# Patient Record
Sex: FEMALE | Race: OTHER | Employment: FULL TIME | URBAN - METROPOLITAN AREA
[De-identification: names, ages, dates, MRNs, and addresses within clinical notes are randomized per-mention and may not be internally consistent; named-entity substitution may affect disease eponyms.]

---

## 2019-02-22 ENCOUNTER — LAB ENCOUNTER (OUTPATIENT)
Dept: LAB | Facility: HOSPITAL | Age: 39
End: 2019-02-22
Attending: OBSTETRICS & GYNECOLOGY
Payer: COMMERCIAL

## 2019-02-22 ENCOUNTER — OFFICE VISIT (OUTPATIENT)
Dept: OBGYN CLINIC | Facility: CLINIC | Age: 39
End: 2019-02-22
Payer: COMMERCIAL

## 2019-02-22 VITALS
DIASTOLIC BLOOD PRESSURE: 96 MMHG | HEART RATE: 102 BPM | BODY MASS INDEX: 36.6 KG/M2 | WEIGHT: 184 LBS | SYSTOLIC BLOOD PRESSURE: 141 MMHG | HEIGHT: 59.5 IN

## 2019-02-22 DIAGNOSIS — Z12.4 SCREENING FOR MALIGNANT NEOPLASM OF CERVIX: ICD-10-CM

## 2019-02-22 DIAGNOSIS — Z12.31 SCREENING MAMMOGRAM, ENCOUNTER FOR: ICD-10-CM

## 2019-02-22 DIAGNOSIS — Z11.3 SCREEN FOR STD (SEXUALLY TRANSMITTED DISEASE): ICD-10-CM

## 2019-02-22 DIAGNOSIS — Z80.3 FAMILY HISTORY OF BREAST CANCER: ICD-10-CM

## 2019-02-22 DIAGNOSIS — Z01.419 ENCOUNTER FOR GYNECOLOGICAL EXAMINATION WITHOUT ABNORMAL FINDING: Primary | ICD-10-CM

## 2019-02-22 PROCEDURE — 99385 PREV VISIT NEW AGE 18-39: CPT | Performed by: OBSTETRICS & GYNECOLOGY

## 2019-02-22 PROCEDURE — 36415 COLL VENOUS BLD VENIPUNCTURE: CPT

## 2019-02-22 PROCEDURE — 86780 TREPONEMA PALLIDUM: CPT

## 2019-02-22 PROCEDURE — 80074 ACUTE HEPATITIS PANEL: CPT

## 2019-02-22 PROCEDURE — 87389 HIV-1 AG W/HIV-1&-2 AB AG IA: CPT

## 2019-02-22 NOTE — H&P
HPI:  The patient is a 44 yo F here for [employer]. Sexually active. Was in same sex relationship, but it ended due to s/o infidelity. Wants full STD screen. Monthly regular cycles. No Bc. Family history of breast CA (mom dx with breast ca in 45s).       L organizations: Not on file        Relationship status: Not on file      Intimate partner violence:        Fear of current or ex partner: Not on file        Emotionally abused: Not on file        Physically abused: Not on file        Forced sexual activity: adenopathy is noted  Breast: normal without palpable masses, tenderness, asymmetry, nipple discharge, nipple retraction or skin changes  Abdomen:  soft, nontender, nondistended, no masses  Skin/Hair: no unusual rashes or bruises  Extremities: no edema, no

## 2019-02-23 LAB
HAV IGM SER QL: NONREACTIVE
HBV CORE IGM SER QL: NONREACTIVE
HBV SURFACE AG SERPL QL IA: NONREACTIVE
HCV AB SERPL QL IA: NONREACTIVE

## 2019-02-24 LAB
C TRACH DNA SPEC QL NAA+PROBE: NEGATIVE
HPV I/H RISK 1 DNA SPEC QL NAA+PROBE: NEGATIVE
N GONORRHOEA DNA SPEC QL NAA+PROBE: NEGATIVE

## 2019-02-25 LAB
T PALLIDUM AB SER QL: NEGATIVE
T VAGINALIS RRNA SPEC QL NAA+PROBE: NEGATIVE

## 2019-02-26 ENCOUNTER — OFFICE VISIT (OUTPATIENT)
Dept: INTERNAL MEDICINE CLINIC | Facility: CLINIC | Age: 39
End: 2019-02-26
Payer: COMMERCIAL

## 2019-02-26 VITALS
DIASTOLIC BLOOD PRESSURE: 87 MMHG | HEART RATE: 103 BPM | HEIGHT: 59 IN | SYSTOLIC BLOOD PRESSURE: 133 MMHG | WEIGHT: 187 LBS | RESPIRATION RATE: 18 BRPM | BODY MASS INDEX: 37.7 KG/M2

## 2019-02-26 DIAGNOSIS — E66.09 CLASS 2 OBESITY DUE TO EXCESS CALORIES WITHOUT SERIOUS COMORBIDITY WITH BODY MASS INDEX (BMI) OF 37.0 TO 37.9 IN ADULT: ICD-10-CM

## 2019-02-26 DIAGNOSIS — R06.00 DOE (DYSPNEA ON EXERTION): ICD-10-CM

## 2019-02-26 DIAGNOSIS — I10 HYPERTENSION, UNSPECIFIED TYPE: Primary | ICD-10-CM

## 2019-02-26 DIAGNOSIS — Z80.3 FAMILY HISTORY OF BREAST CANCER IN FIRST DEGREE RELATIVE: ICD-10-CM

## 2019-02-26 PROCEDURE — 99212 OFFICE O/P EST SF 10 MIN: CPT | Performed by: INTERNAL MEDICINE

## 2019-02-26 PROCEDURE — 99203 OFFICE O/P NEW LOW 30 MIN: CPT | Performed by: INTERNAL MEDICINE

## 2019-02-26 NOTE — PATIENT INSTRUCTIONS
Low-Salt Choices  Eating salt (sodium) can make your body retain too much water. Excess water makes your heart work harder. Canned, packaged, and frozen foods are easy to prepare. But they are often high in sodium.  Here are some ideas for low-salt foods yo

## 2019-02-26 NOTE — PROGRESS NOTES
Swati Del Angel is a 43year old female.   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

## 2019-02-27 ENCOUNTER — LAB ENCOUNTER (OUTPATIENT)
Dept: LAB | Facility: HOSPITAL | Age: 39
End: 2019-02-27
Attending: OBSTETRICS & GYNECOLOGY
Payer: COMMERCIAL

## 2019-02-27 ENCOUNTER — HOSPITAL ENCOUNTER (OUTPATIENT)
Dept: MAMMOGRAPHY | Age: 39
Discharge: HOME | End: 2019-02-27
Attending: OBSTETRICS & GYNECOLOGY
Payer: COMMERCIAL

## 2019-02-27 DIAGNOSIS — Z80.3 FAMILY HISTORY OF BREAST CANCER: ICD-10-CM

## 2019-02-27 DIAGNOSIS — I10 HYPERTENSION, UNSPECIFIED TYPE: ICD-10-CM

## 2019-02-27 DIAGNOSIS — Z12.31 SCREENING MAMMOGRAM, ENCOUNTER FOR: ICD-10-CM

## 2019-02-27 LAB
ALBUMIN SERPL-MCNC: 3.6 G/DL (ref 3.4–5)
ALBUMIN/GLOB SERPL: 0.8 {RATIO} (ref 1–2)
ALP LIVER SERPL-CCNC: 67 U/L (ref 37–98)
ALT SERPL-CCNC: 42 U/L (ref 13–56)
ANION GAP SERPL CALC-SCNC: 7 MMOL/L (ref 0–18)
AST SERPL-CCNC: 25 U/L (ref 15–37)
BASOPHILS # BLD AUTO: 0.04 X10(3) UL (ref 0–0.2)
BASOPHILS NFR BLD AUTO: 0.4 %
BILIRUB SERPL-MCNC: 0.3 MG/DL (ref 0.1–2)
BUN BLD-MCNC: 14 MG/DL (ref 7–18)
BUN/CREAT SERPL: 18.7 (ref 10–20)
CALCIUM BLD-MCNC: 8.7 MG/DL (ref 8.5–10.1)
CHLORIDE SERPL-SCNC: 110 MMOL/L (ref 98–107)
CHOLEST SMN-MCNC: 222 MG/DL (ref ?–200)
CO2 SERPL-SCNC: 24 MMOL/L (ref 21–32)
CREAT BLD-MCNC: 0.75 MG/DL (ref 0.55–1.02)
DEPRECATED RDW RBC AUTO: 45.4 FL (ref 35.1–46.3)
EOSINOPHIL # BLD AUTO: 0.28 X10(3) UL (ref 0–0.7)
EOSINOPHIL NFR BLD AUTO: 3.1 %
ERYTHROCYTE [DISTWIDTH] IN BLOOD BY AUTOMATED COUNT: 13.6 % (ref 11–15)
GLOBULIN PLAS-MCNC: 4.4 G/DL (ref 2.8–4.4)
GLUCOSE BLD-MCNC: 95 MG/DL (ref 70–99)
HCT VFR BLD AUTO: 38.2 % (ref 35–48)
HDLC SERPL-MCNC: 50 MG/DL (ref 40–59)
HGB BLD-MCNC: 12 G/DL (ref 12–16)
IMM GRANULOCYTES # BLD AUTO: 0.03 X10(3) UL (ref 0–1)
IMM GRANULOCYTES NFR BLD: 0.3 %
LDLC SERPL CALC-MCNC: 138 MG/DL (ref ?–100)
LYMPHOCYTES # BLD AUTO: 2.43 X10(3) UL (ref 1–4)
LYMPHOCYTES NFR BLD AUTO: 26.8 %
M PROTEIN MFR SERPL ELPH: 8 G/DL (ref 6.4–8.2)
MCH RBC QN AUTO: 28.6 PG (ref 26–34)
MCHC RBC AUTO-ENTMCNC: 31.4 G/DL (ref 31–37)
MCV RBC AUTO: 91.2 FL (ref 80–100)
MONOCYTES # BLD AUTO: 0.48 X10(3) UL (ref 0.1–1)
MONOCYTES NFR BLD AUTO: 5.3 %
NEUTROPHILS # BLD AUTO: 5.82 X10 (3) UL (ref 1.5–7.7)
NEUTROPHILS # BLD AUTO: 5.82 X10(3) UL (ref 1.5–7.7)
NEUTROPHILS NFR BLD AUTO: 64.1 %
NONHDLC SERPL-MCNC: 172 MG/DL (ref ?–130)
OSMOLALITY SERPL CALC.SUM OF ELEC: 292 MOSM/KG (ref 275–295)
PLATELET # BLD AUTO: 333 10(3)UL (ref 150–450)
POTASSIUM SERPL-SCNC: 4.4 MMOL/L (ref 3.5–5.1)
RBC # BLD AUTO: 4.19 X10(6)UL (ref 3.8–5.3)
SODIUM SERPL-SCNC: 141 MMOL/L (ref 136–145)
TRIGL SERPL-MCNC: 171 MG/DL (ref 30–149)
TSI SER-ACNC: 1.61 MIU/ML (ref 0.36–3.74)
VLDLC SERPL CALC-MCNC: 34 MG/DL (ref 0–30)
WBC # BLD AUTO: 9.1 X10(3) UL (ref 4–11)

## 2019-02-27 PROCEDURE — 77063 BREAST TOMOSYNTHESIS BI: CPT | Performed by: OBSTETRICS & GYNECOLOGY

## 2019-02-27 PROCEDURE — 84443 ASSAY THYROID STIM HORMONE: CPT

## 2019-02-27 PROCEDURE — 77067 SCR MAMMO BI INCL CAD: CPT | Performed by: OBSTETRICS & GYNECOLOGY

## 2019-02-27 PROCEDURE — 85025 COMPLETE CBC W/AUTO DIFF WBC: CPT

## 2019-02-27 PROCEDURE — 80061 LIPID PANEL: CPT

## 2019-02-27 PROCEDURE — 36415 COLL VENOUS BLD VENIPUNCTURE: CPT

## 2019-02-27 PROCEDURE — 80053 COMPREHEN METABOLIC PANEL: CPT

## 2019-03-04 ENCOUNTER — TELEPHONE (OUTPATIENT)
Dept: OBGYN CLINIC | Facility: CLINIC | Age: 39
End: 2019-03-04

## 2019-03-04 NOTE — TELEPHONE ENCOUNTER
----- Message from Joe Bautista DO sent at 3/1/2019 11:43 PM CST -----  Pls notify of normal pap and std screen. ?BV on pap. Symptoms?  If so, okay for flagyl or metrogel

## 2019-03-04 NOTE — TELEPHONE ENCOUNTER
Informed pt that [address] stated normal pap and std screen. Pt denies symptoms of BV, so no flagyl or metrogel will be sent.

## 2019-03-27 ENCOUNTER — OFFICE VISIT (OUTPATIENT)
Dept: INTERNAL MEDICINE CLINIC | Facility: CLINIC | Age: 39
End: 2019-03-27
Payer: COMMERCIAL

## 2019-03-27 VITALS
SYSTOLIC BLOOD PRESSURE: 130 MMHG | WEIGHT: 180 LBS | HEART RATE: 97 BPM | BODY MASS INDEX: 35.34 KG/M2 | DIASTOLIC BLOOD PRESSURE: 87 MMHG | TEMPERATURE: 98 F | HEIGHT: 60 IN

## 2019-03-27 DIAGNOSIS — G47.33 OSA (OBSTRUCTIVE SLEEP APNEA): ICD-10-CM

## 2019-03-27 DIAGNOSIS — Z80.3 FAMILY HISTORY OF BREAST CANCER IN FIRST DEGREE RELATIVE: ICD-10-CM

## 2019-03-27 DIAGNOSIS — E66.09 CLASS 2 OBESITY DUE TO EXCESS CALORIES WITHOUT SERIOUS COMORBIDITY WITH BODY MASS INDEX (BMI) OF 37.0 TO 37.9 IN ADULT: ICD-10-CM

## 2019-03-27 DIAGNOSIS — Z00.00 PHYSICAL EXAM: Primary | ICD-10-CM

## 2019-03-27 PROCEDURE — 99395 PREV VISIT EST AGE 18-39: CPT | Performed by: INTERNAL MEDICINE
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
## 2021-03-07 ENCOUNTER — HOSPITAL ENCOUNTER (OUTPATIENT)
Age: 41
Discharge: HOME | End: 2021-03-07
Payer: COMMERCIAL

## 2021-03-07 VITALS
DIASTOLIC BLOOD PRESSURE: 88 MMHG | BODY MASS INDEX: 33.04 KG/M2 | RESPIRATION RATE: 16 BRPM | TEMPERATURE: 98 F | HEART RATE: 112 BPM | WEIGHT: 175 LBS | HEIGHT: 61 IN | SYSTOLIC BLOOD PRESSURE: 141 MMHG | OXYGEN SATURATION: 99 %

## 2021-03-07 DIAGNOSIS — B34.9 VIRAL ILLNESS: ICD-10-CM

## 2021-03-07 DIAGNOSIS — R05.9 COUGH: Primary | ICD-10-CM

## 2021-03-07 DIAGNOSIS — Z20.822 ENCOUNTER FOR SCREENING LABORATORY TESTING FOR COVID-19 VIRUS: ICD-10-CM

## 2021-03-07 DIAGNOSIS — R00.0 TACHYCARDIA: ICD-10-CM

## 2021-03-07 DIAGNOSIS — R03.0 ELEVATED BLOOD PRESSURE READING: ICD-10-CM

## 2021-03-07 LAB — SARS-COV-2 RNA RESP QL NAA+PROBE: NOT DETECTED

## 2021-03-07 PROCEDURE — 93000 ELECTROCARDIOGRAM COMPLETE: CPT | Performed by: NURSE PRACTITIONER

## 2021-03-07 PROCEDURE — U0002 COVID-19 LAB TEST NON-CDC: HCPCS | Performed by: NURSE PRACTITIONER

## 2021-03-07 PROCEDURE — 99202 OFFICE O/P NEW SF 15 MIN: CPT | Performed by: NURSE PRACTITIONER

## 2021-03-07 NOTE — ED INITIAL ASSESSMENT (HPI)
Pt here today for COVID testing. States work is needed her tested prior to coming back, as she was sick earlier this week with cough, diarrhea, loss of taste and smell.

## 2022-03-02 ENCOUNTER — HOSPITAL ENCOUNTER (EMERGENCY)
Facility: HOSPITAL | Age: 42
Discharge: HOME | End: 2022-03-02
Attending: EMERGENCY MEDICINE
Payer: COMMERCIAL

## 2022-03-02 ENCOUNTER — APPOINTMENT (OUTPATIENT)
Dept: ULTRASOUND IMAGING | Facility: HOSPITAL | Age: 42
End: 2022-03-02
Attending: EMERGENCY MEDICINE
Payer: COMMERCIAL

## 2022-03-02 VITALS
OXYGEN SATURATION: 98 % | RESPIRATION RATE: 16 BRPM | WEIGHT: 176 LBS | HEIGHT: 61 IN | BODY MASS INDEX: 33.23 KG/M2 | SYSTOLIC BLOOD PRESSURE: 136 MMHG | HEART RATE: 78 BPM | TEMPERATURE: 98 F | DIASTOLIC BLOOD PRESSURE: 82 MMHG

## 2022-03-02 DIAGNOSIS — S80.12XA HEMATOMA OF LEFT LOWER EXTREMITY, INITIAL ENCOUNTER: ICD-10-CM

## 2022-03-02 DIAGNOSIS — S86.912A STRAIN OF UNSPECIFIED MUSCLE(S) AND TENDON(S) AT LOWER LEG LEVEL, LEFT LEG, INITIAL ENCOUNTER: Primary | ICD-10-CM

## 2022-03-02 PROCEDURE — 93971 EXTREMITY STUDY: CPT | Performed by: EMERGENCY MEDICINE

## 2022-03-02 PROCEDURE — 99284 EMERGENCY DEPT VISIT MOD MDM: CPT

## 2022-03-02 RX ORDER — IBUPROFEN 400 MG/1
400 TABLET ORAL ONCE
Status: COMPLETED | OUTPATIENT
Start: 2022-03-02 | End: 2022-03-02

## 2022-03-02 NOTE — ED QUICK NOTES
Pt dcd to home aox3, ambulatory with crutches, discharge instruction given and voices understanding, denies any concern

## 2022-03-02 NOTE — ED INITIAL ASSESSMENT (HPI)
Pt c/o on Monday she stepped \"weird\" and felt a \"pop\" in the left calve. Pt states she notices some swelling the the area.

## 2023-04-14 NOTE — PATIENT INSTRUCTIONS
Your blood pressure is elevated. Monitor your blood pressure at home and record your results. Monitor your diet and salt intake.
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
## 2024-05-14 NOTE — TELEPHONE ENCOUNTER
Signed/completed [facility] enrollment form, pt chart notes, pt insurance in triage room \"Biologic\" bin.   Pending labs. Once labs received can fax over to [facility].

## 2024-07-10 NOTE — TELEPHONE ENCOUNTER
From: Brenda Portillo  To: Dayana Rodriguez  Sent: 7/9/2024  5:34 PM CDT  Subject: Mental health    I just lost my brother I’m not there mentally I need a little time from work like maybe a week I just can’t I’m not good       Unable to leave message for patient to call back and discuss. (Mailbox full)  [facility] message sent as well on alternative message.

## 2024-07-10 NOTE — TELEPHONE ENCOUNTER
From: Brenda Portillo  To: Dayana Rodriguez  Sent: 7/9/2024 5:34 PM CDT  Subject: Mental health     I just lost my brother I’m not there mentally I need a little time from work like maybe a week I just can’t I’m not good

## 2024-07-11 NOTE — TELEPHONE ENCOUNTER
Attempted to call patient-unable to leave a message as mailbox is full  Patient has not read [facility] message  Read Composed From To  Subject   N 7/10/2024  7:38 AM [facility]     Patient last seen by JENNIFER Zapien 04/28/2023

## 2024-07-16 NOTE — TELEPHONE ENCOUNTER
Patient's mother called    Very aggressive and upset that patient was scheduled with DM again. States Dr. Giraldo is \" crazy\". The  \" doesn't know what she is doing\". Vulgar language used. Patient's mother advised to call the pride line. Call disconnected

## 2024-10-23 NOTE — ED INITIAL ASSESSMENT (HPI)
Patient presents to ED via EMS with c/o chest pain that began approx 1.5 hours ago while at work. Patient states that she was vomiting and felt the chest pain start. Patient was working and felt a mid to left sternal chest pain. Patient was given 324 mg of Aspirin by EMS, and 1 nitroglycerin tab, and felt immediate increase in pain. Patient dry heaved a couple times and while sitting up felt relief. Pain went from a 7-2/10 after sitting up. Patient is also trying to stop drinking, last drink was Tuesday at 1000.
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
## 2024-10-23 NOTE — ED QUICK NOTES
Manjeet provided resources. IV removed with catheter intact. Ambulatory out of ED in no distress. Verbalized understanding of discharge instructions.

## 2024-10-23 NOTE — ED QUICK NOTES
Rounding Completed    Plan of Care reviewed. Waiting for lab results to finalize. Patient has felt some improvement from initial medication administration.  Improvement in BP and HR noted.  Elimination needs assessed, patient aware we do need a urine sample.  Respirations regular and unlabored.      Bed is locked and in lowest position. Call light within reach.

## 2024-10-23 NOTE — DISCHARGE INSTRUCTIONS
Thank you for seeking care at [facility] Emergency Department.    You have been seen and evaluated for alcohol use as well as chest pain and vomiting. Your labs, EKG and chest x-ray today did not show severe findings.      We discussed the results of your workup   Please read the instructions provided   If given prescriptions, take as instructed   Do not use illicit drugs or non-prescribed medications    We monitored you in the emergency department while you were intoxicated and reevaluated you upon sobriety. All of your testing that was done was normal and your vital signs were stable when you were discharged home; you had not started to show signs of alcohol withdrawal. If you drink every single day you may start to feel alcohol withdrawal.    Chronic alcohol abuse can cause severe health consequences including but not limited to damage to your liver, your pancreas, your heart, and your immune system. Additionally, chronic alcohol abuse can affect the way that you eat and can cause several nutritional deficiencies with health consequences of their own. Please eat a balanced diet and take a multivitamin daily. You should progressively cut back on your alcohol intake and then stop drinking alcohol completely, but watch for signs of alcohol withdrawal.     Alcohol withdrawal can be dangerous. Signs of alcohol withdrawal are shaking and tremors, sweating, fast heart rate, high blood pressure, confusion, hallucinations.  Your chances of going into alcohol withdrawal are increased if you suddenly go from drinking a lot of alcohol to no alcohol. This is why it is recommended that you progressively decrease your drinking, or go to a detox center to withdraw from alcohol safely.  If you begin having signs of alcohol withdrawal you need to seek medical attention as this may lead to fatal seizures if the withdrawal is very severe.    Some individuals find Alcoholics Anonymous helpful when attempting to stop drinking. If  you would like to try this please call the [facility] ([phone]) to find out about local AA meetings.    Remember, your care process does not end after your visit today. Please follow-up with your primary care provider  in 1-2 days for further evaluation of your alcohol use disorder and to see if you need any further evaluation/testing, or to evaluate for any alternate diagnoses.    Your blood pressure was noted to be elevated in the ER today. Please follow up with your primary doctor within the next 2-3 months for a reevaluation. Uncontrolled high blood pressure can lead to strokes, heart attacks, and kidney failure.     Please return to the emergency department immediately if you develop any symptoms mentioned above, if you have abdominal pain, chest pain, inability to eat or drink due to vomiting, vomiting blood/clots, fever or if you develop any other new or concerning symptoms as these could be signs of more serious medical illness.
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
## 2024-10-23 NOTE — ED QUICK NOTES
Assumed care of patient , resting on cart, states she still has some chest pains. 2 hour drawn and sent. Will continue to monitor.

## 2024-10-24 NOTE — TELEPHONE ENCOUNTER
Patient stated that she was in the emergency room yesterday. Chest pain and shortness of breath are gone now, but still vomiting. Drinking fluids. States that can not hold anything down but still sipping fluids. Feels hungry today. Has not picked up the zofran yet. Advised patient to pickup the zofran to help with the nausea/vomiting and slowly drink fluids and increase slowly as tolerated. If still not able to keep fluids down then should go back to the emergency room. Patient verbalized understanding. Tried to offer appointment this morning with any available provider but patient declined. Stated that she is just going to go to sleep now. States she works nights. Appointment made for tomorrow at 10am with Tamika Lovell at [facility]. Advised patient to continue with fluids with electrolytes to stay hydrated, BRAT(bland)(bread, rice, apple, toast, banana) diet as tolerated.  Advised patient no alcohol or acetaminophen/tylenol since liver enzymes elevated. Patient verbalized understanding.     Disposition and Plan      Clinical Impression:  1. Acute chest pain    2. Alcohol use disorder    3. Alcohol withdrawal syndrome without complication (HCC)    4. Acute alcoholic gastritis with hemorrhage    5. Transaminitis    6. Elevated blood pressure reading          Disposition:  Discharge  10/23/2024  4:34 am     Follow-up:  Nonstaff, Physician     Schedule an appointment as soon as possible for a visit in 2 day(s)        [facility] Emergency Department  [address]  [facility] [id number] [phone]  Go to  If symptoms worsen, immediately     Leobardo River MD  [address]  [facility] [id number] [phone]     Schedule an appointment as soon as possible for a visit in 2 day(s)        Marquis Hill MD  [address]  [facility] [address] [phone]     Schedule an appointment as soon as possible for a visit in 1 week(s)                 Medications Prescribed:  Discharge Medication  List as of 10/23/2024  4:37 AM              START taking these medications     Details   pantoprazole 40 MG Oral Tab EC Take 1 tablet (40 mg total) by mouth daily., Normal, Disp-30 tablet, R-0       ondansetron 4 MG Oral Tablet Dispersible Take 1 tablet (4 mg total) by mouth every 4 (four) hours as needed for Nausea., Normal, Disp-10 tablet, R-0       chlordiazePOXIDE 25 MG Oral Cap Take 1 capsule (25 mg total) by mouth 3 (three) times daily as needed for Anxiety (Alcohol withdrawal symptoms). Take 2 capsules every 6 hours as needed (for alcohol withdrawal symptoms) for 1 day, THEN 1 capsule every 6 hours as needed (for alcohol with drawal symptoms) for 1 day, THEN 1 capsule every 12 hours as needed (for alcohol withdrawal symptoms) for 2 days, THEN 1 capsule at bedtime as needed (for alcohol withdrawal symptoms), Print, Disp-21 capsule, R-0

## 2024-10-25 NOTE — PROGRESS NOTES
Subjective:   Brenda Portillo is a 44 year old female who presents for ER F/U (Chest Pain) and Abdominal Pain     Was seen in ED on 10/23 with hematemesis and alcohol withdrawal and was discharged home on Librium     Still nauseous and vomiting - last emesis episode was about 3 hours ago which was bilious and sometimes flecks of blood   Had not had hematemesis since the ER visit   Having diarrhea and soft stools, has not looked at the color so unclear if there was blood or black stools   Urine is an orange color for the past several days    Last drink of alcohol was at 4am - had started feeling tremors and severe nausea so took a drink to avoid withdrawal  Pharmacy did not have the librium when she went to pick it up, they had to order it, but they do have it in stock now and she is planning to go pick it up after this appointment    Chest pressure and trouble breathing have resolved  Felt like she had a ball in her throat prior but does not feel it anymore   Tried to take a few bites of food but vomited after   Has been taking propel and gatorade which has stayed down   Plain water makes her sick  Picked up the nausea medication but has not taken it yet   Feeling mildly anxious   Tremors in hands are starting again  Sweats when she sleeps   Has had episodes of fast heart rate   Denies confusion or hallucinations    Has never gone through alcohol withdrawal    Was in [place] last weekend and was drinking a lot and felt warm and chills on Saturday     RN triage note from 10/24-  Patient stated that she was in the emergency room yesterday. Chest pain and shortness of breath are gone now, but still vomiting. Drinking fluids. States that can not hold anything down but still sipping fluids. Feels hungry today. Has not picked up the zofran yet. Advised patient to pickup the zofran to help with the nausea/vomiting and slowly drink fluids and increase slowly as tolerated. If still not able to keep fluids down then should  go back to the emergency room. Patient verbalized understanding. Tried to offer appointment this morning with any available provider but patient declined. Stated that she is just going to go to sleep now. States she works nights.     History/Other:    Chief Complaint Reviewed and Verified  Nursing Notes Reviewed and   Verified  Tobacco Reviewed  Allergies Reviewed  Medications Reviewed         Tobacco:  She smoked tobacco in the past but quit greater than 12 months ago.  Social History     Tobacco Use   Smoking Status Former    Types: Cigarettes   Smokeless Tobacco Never   Tobacco Comments    social        Current Outpatient Medications   Medication Sig Dispense Refill    pantoprazole 40 MG Oral Tab EC Take 1 tablet (40 mg total) by mouth daily. 30 tablet 0    ondansetron 4 MG Oral Tablet Dispersible Take 1 tablet (4 mg total) by mouth every 4 (four) hours as needed for Nausea. 10 tablet 0    chlordiazePOXIDE 25 MG Oral Cap Take 1 capsule (25 mg total) by mouth 3 (three) times daily as needed for Anxiety (Alcohol withdrawal symptoms). Take 2 capsules every 6 hours as needed (for alcohol withdrawal symptoms) for 1 day, THEN 1 capsule every 6 hours as needed (for alcohol withdrawal symptoms) for 1 day, THEN 1 capsule every 12 hours as needed (for alcohol withdrawal symptoms) for 2 days, THEN 1 capsule at bedtime as needed (for alcohol withdrawal symptoms) 21 capsule 0    clotrimazole-betamethasone 1-0.05 % External Cream Apply 1 Application topically 2 (two) times daily as needed. 60 g 0     Review of Systems:  Review of Systems  10 point review of systems otherwise negative with the exception of HPI and assessment and plan    Objective:   BP (!) 148/92   Pulse 95   Ht 5' 1\" (1.549 m)   Wt 173 lb (78.5 kg)   LMP 09/01/2024 (Approximate)   SpO2 97%   BMI 32.69 kg/m²  Estimated body mass index is 32.69 kg/m² as calculated from the following:    Height as of this encounter: 5' 1\" (1.549 m).    Weight as of  this encounter: 173 lb (78.5 kg).  Physical Exam  Vitals reviewed.   Constitutional:       Appearance: She is well-developed. She is obese. She is ill-appearing.   Cardiovascular:      Rate and Rhythm: Normal rate and regular rhythm.      Heart sounds: Normal heart sounds.   Pulmonary:      Effort: Pulmonary effort is normal.      Breath sounds: Normal breath sounds.   Abdominal:      General: Bowel sounds are normal.      Palpations: Abdomen is soft.   Skin:     General: Skin is warm and dry.   Neurological:      Mental Status: She is alert and oriented to person, place, and time.       Assessment & Plan:   1. Hematemesis with nausea (Primary)  -     Gastro Referral - In Network  2. Alcohol use disorder  -     Gastro Referral - In Network  Has not called GI yet - provided referral again    librium and start using this as well as zofran   Monitor withdrawal symptoms and if experiences confusion, bloody emesis, or other severe symptom should return to the ER   Not cleared to return to work - will need to be re evaluated next week    SHIRLEY Nava, 10/25/2024, 9:47 AM

## 2024-10-30 NOTE — PROGRESS NOTES
Subjective:   Brenda Portillo is a 44 year old female who presents for Follow - Up     Last drink was on Saturday because there was a sports event on  Never took the librium, withdrew from alcohol without medical assistance   Almost went back to the ER for bad tremors and nausea over the weekend but feeling much better now  Stomach is still upset, has nausea within 10 minutes of eating anything  When she goes to have a bowel movement feels like vomiting at the same time   She is eating a little more now, has not taken the nausea medication  Vomited on Monday due to over-eating, mostly undigested food and had a very small tinge of blood   Tries to eat something every 2 hours   Hydrating with water and Gatorade, tea   Has been fluctuating between constipated and loose stools   No blood in the stool or dark tarry stools   Has been eating rice, beans, had [employer]otle yesterday, crackers   Tries some chicken tinga and had steak at [employer]     Feels ready to go back to work tomorrow   Sleeping better over the past 2 days     Has psoriasis   Is on a cancellation waiting list for a dermatologist out [facility]    Had a bad experience with one of the [facility] dermatologists    History/Other:    Chief Complaint Reviewed and Verified  No Further Nursing Notes to   Review  Tobacco Reviewed  Allergies Reviewed  Medications Reviewed    Problem List Reviewed  Medical History Reviewed  Surgical History   Reviewed  OB Status Reviewed  Family History Reviewed         Tobacco:  She smoked tobacco in the past but quit greater than 12 months ago.  Social History     Tobacco Use   Smoking Status Former    Types: Cigarettes   Smokeless Tobacco Never   Tobacco Comments    social        Current Outpatient Medications   Medication Sig Dispense Refill    pantoprazole 40 MG Oral Tab EC Take 1 tablet (40 mg total) by mouth daily. (Patient not taking: Reported on 10/30/2024) 30 tablet 0    ondansetron 4 MG Oral Tablet Dispersible Take 1  tablet (4 mg total) by mouth every 4 (four) hours as needed for Nausea. (Patient not taking: Reported on 10/30/2024) 10 tablet 0    chlordiazePOXIDE 25 MG Oral Cap Take 1 capsule (25 mg total) by mouth 3 (three) times daily as needed for Anxiety (Alcohol withdrawal symptoms). Take 2 capsules every 6 hours as needed (for alcohol withdrawal symptoms) for 1 day, THEN 1 capsule every 6 hours as needed (for alcohol withdrawal symptoms) for 1 day, THEN 1 capsule every 12 hours as needed (for alcohol withdrawal symptoms) for 2 days, THEN 1 capsule at bedtime as needed (for alcohol withdrawal symptoms) (Patient not taking: Reported on 10/30/2024) 21 capsule 0    clotrimazole-betamethasone 1-0.05 % External Cream Apply 1 Application topically 2 (two) times daily as needed. (Patient not taking: Reported on 10/30/2024) 60 g 0     Review of Systems:  Review of Systems  10 point review of systems otherwise negative with the exception of HPI and assessment and plan    Objective:   /88   Pulse 90   Ht 5' 1\" (1.549 m)   Wt 171 lb (77.6 kg)   LMP 09/01/2024 (Approximate)   SpO2 94%   BMI 32.31 kg/m²  Estimated body mass index is 32.31 kg/m² as calculated from the following:    Height as of this encounter: 5' 1\" (1.549 m).    Weight as of this encounter: 171 lb (77.6 kg).  Physical Exam  Vitals reviewed.   Constitutional:       General: She is not in acute distress.     Appearance: Normal appearance. She is well-developed.   Cardiovascular:      Rate and Rhythm: Normal rate and regular rhythm.      Heart sounds: Normal heart sounds.   Pulmonary:      Effort: Pulmonary effort is normal.      Breath sounds: Normal breath sounds.   Abdominal:      General: Bowel sounds are normal. There is no distension.      Palpations: Abdomen is soft. There is no mass.      Tenderness: There is no abdominal tenderness.   Skin:     General: Skin is warm and dry.   Neurological:      Mental Status: She is alert and oriented to person,  place, and time.       Assessment & Plan:   1. Alcohol use disorder (Primary)  Abstained from alcohol since 10/26  Symptoms are greatly improved, no withdrawal symptoms today   Take zofran 30 minutes before a meal   Add fiber supplement such as metamucil   Has GI appt on 11/21  Provided return to work note     SHIRLEY Nava, 10/30/2024, 10:04 AM

## 2024-11-13 NOTE — ED INITIAL ASSESSMENT (HPI)
Patient brought to ED by EMS from work. Patient is c/o of nausea, vomiting, and right sided abdominal pain. Patient states has upcoming appointment to see gastroenterologist for GI problems.
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
## 2024-11-13 NOTE — ED QUICK NOTES
Orders for admission, patient is aware of plan and ready to go upstairs. Any questions, please call ED RN Shyla at extension [phone].     Patient Covid vaccination status: Unvaccinated     COVID Test Ordered in ED: None    COVID Suspicion at Admission: N/A    Running Infusions:      Mental Status/LOC at time of transport: A&Ox4    Other pertinent information:   CIWA score: 8   NIH score:  N/A
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
## 2024-11-13 NOTE — ED QUICK NOTES
Pt unable to tolerate fluids orally.  Pt started vomiting again. HR increased.  MD aware, orders to follow.

## 2024-11-13 NOTE — PLAN OF CARE
Problem: Patient Centered Care  Goal: Patient preferences are identified and integrated in the patient's plan of care  Description: Interventions:  - What would you like us to know as we care for you? Home alone   - Provide timely, complete, and accurate information to patient/family  - Incorporate patient and family knowledge, values, beliefs, and cultural backgrounds into the planning and delivery of care  - Encourage patient/family to participate in care and decision-making at the level they choose  - Honor patient and family perspectives and choices  Outcome: Progressing     Problem: Patient/Family Goals  Goal: Patient/Family Long Term Goal  Description: Patient's Long Term Goal: Discharge    Interventions:  - Follow healthcare team treatment plan   - Monitor labs, vitals, and diagnostic test  - Pain Management   - Diet Recommendations  - Participate in therapy  - Discharge planning   - See additional Care Plan goals for specific interventions  Outcome: Progressing  Goal: Patient/Family Short Term Goal  Description: Patient's Short Term Goal: feel better    Interventions:   - - Follow healthcare team treatment plan   - Monitor labs, vitals, and diagnostic test  - Pain Management, pharmacological and non-pharmacological interventions  - Diet Recommendations  - Adequate oral intake  - GI consults  - Pysch consult   - antiemetics as needed   -IV hydration   -CIWA per protocol   - See additional Care Plan goals for specific interventions  Outcome: Progressing     Problem: PAIN - ADULT  Goal: Verbalizes/displays adequate comfort level or patient's stated pain goal  Description: INTERVENTIONS:  - Encourage pt to monitor pain and request assistance  - Assess pain using appropriate pain scale  - Administer analgesics based on type and severity of pain and evaluate response  - Implement non-pharmacological measures as appropriate and evaluate response  - Consider cultural and social influences on pain and pain  management  - Manage/alleviate anxiety  - Utilize distraction and/or relaxation techniques  - Monitor for opioid side effects  - Notify MD/LIP if interventions unsuccessful or patient reports new pain  - Anticipate increased pain with activity and pre-medicate as appropriate  Outcome: Progressing     Problem: DISCHARGE PLANNING  Goal: Discharge to home or other facility with appropriate resources  Description: INTERVENTIONS:  - Identify barriers to discharge w/pt and caregiver  - Include patient/family/discharge partner in discharge planning  - Arrange for needed discharge resources and transportation as appropriate  - Identify discharge learning needs (meds, wound care, etc)  - Arrange for interpreters to assist at discharge as needed  - Consider post-discharge preferences of patient/family/discharge partner  - Complete POLST form as appropriate  - Assess patient's ability to be responsible for managing their own health  - Refer to Case Management Department for coordinating discharge planning if the patient needs post-hospital services based on physician/LIP order or complex needs related to functional status, cognitive ability or social support system  Outcome: Progressing     Problem: GASTROINTESTINAL - ADULT  Goal: Minimal or absence of nausea and vomiting  Description: INTERVENTIONS:  - Maintain adequate hydration with IV or PO as ordered and tolerated  - Nasogastric tube to low intermittent suction as ordered  - Evaluate effectiveness of ordered antiemetic medications  - Provide nonpharmacologic comfort measures as appropriate  - Advance diet as tolerated, if ordered  - Obtain nutritional consult as needed  - Evaluate fluid balance  Outcome: Progressing     Problem: METABOLIC/FLUID AND ELECTROLYTES - ADULT  Goal: Electrolytes maintained within normal limits  Description: INTERVENTIONS:  - Monitor labs and rhythm and assess patient for signs and symptoms of electrolyte imbalances  - Administer electrolyte  replacement as ordered  - Monitor response to electrolyte replacements, including rhythm and repeat lab results as appropriate  - Fluid restriction as ordered  - Instruct patient on fluid and nutrition restrictions as appropriate  Outcome: Progressing
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
## 2024-11-14 NOTE — DISCHARGE INSTRUCTIONS
You were seen by Psychiatry while in the hospital. The recommendation is substance abuse treatment. Referrals for inpatient and outpatient services are listed below. Please CALL to schedule an intake appointment.    [facility]  [address] [place] [address]  [phone]    [facility]  Outpatient  [address] [address]  [phone]    [facility] [facility]   [facility]  [address]  [address]  [place] [address]  [phone]    [facility]  [address] [place] [address]  [phone]     [facility]  [facility]  [address] [place] [address]  [phone]    Referrals for Psychiatry and Therapy:    [facility] - multiple locations   Phone: [phone]    [facility]: [address] [place] [address]  Phone: [phone]    [facility]: [address] [place] [address]  Phone: [phone]    If you feel unsafe or your symptoms intensify, please call 9-1-1 or go to the nearest emergency room.    Check your BP daily at home in AM keep log for your appt with Tamika Lovell.

## 2024-11-14 NOTE — PLAN OF CARE
Problem: Patient Centered Care  Goal: Patient preferences are identified and integrated in the patient's plan of care  Description: Interventions:  - What would you like us to know as we care for you? Home alone   - Provide timely, complete, and accurate information to patient/family  - Incorporate patient and family knowledge, values, beliefs, and cultural backgrounds into the planning and delivery of care  - Encourage patient/family to participate in care and decision-making at the level they choose  - Honor patient and family perspectives and choices  Outcome: Progressing     Problem: Patient/Family Goals  Goal: Patient/Family Long Term Goal  Description: Patient's Long Term Goal: Discharge    Interventions:  - Follow healthcare team treatment plan   - Monitor labs, vitals, and diagnostic test  - Pain Management   - Diet Recommendations  - Participate in therapy  - Discharge planning   - See additional Care Plan goals for specific interventions  Outcome: Progressing  Goal: Patient/Family Short Term Goal  Description: Patient's Short Term Goal: feel better    Interventions:   - - Follow healthcare team treatment plan   - Monitor labs, vitals, and diagnostic test  - Pain Management, pharmacological and non-pharmacological interventions  - Diet Recommendations  - Adequate oral intake  - GI consults  - Pysch consult   - antiemetics as needed   -IV hydration   -CIWA per protocol   - See additional Care Plan goals for specific interventions  Outcome: Progressing     Problem: PAIN - ADULT  Goal: Verbalizes/displays adequate comfort level or patient's stated pain goal  Description: INTERVENTIONS:  - Encourage pt to monitor pain and request assistance  - Assess pain using appropriate pain scale  - Administer analgesics based on type and severity of pain and evaluate response  - Implement non-pharmacological measures as appropriate and evaluate response  - Consider cultural and social influences on pain and pain  management  - Manage/alleviate anxiety  - Utilize distraction and/or relaxation techniques  - Monitor for opioid side effects  - Notify MD/LIP if interventions unsuccessful or patient reports new pain  - Anticipate increased pain with activity and pre-medicate as appropriate  Outcome: Progressing     Problem: DISCHARGE PLANNING  Goal: Discharge to home or other facility with appropriate resources  Description: INTERVENTIONS:  - Identify barriers to discharge w/pt and caregiver  - Include patient/family/discharge partner in discharge planning  - Arrange for needed discharge resources and transportation as appropriate  - Identify discharge learning needs (meds, wound care, etc)  - Arrange for interpreters to assist at discharge as needed  - Consider post-discharge preferences of patient/family/discharge partner  - Complete POLST form as appropriate  - Assess patient's ability to be responsible for managing their own health  - Refer to Case Management Department for coordinating discharge planning if the patient needs post-hospital services based on physician/LIP order or complex needs related to functional status, cognitive ability or social support system  Outcome: Progressing

## 2024-11-14 NOTE — PROGRESS NOTES
[facility]  part of [facility]    Progress Note    Brenda Portillo Patient Status:  Observation    1980 MRN [id number]   Location [facility] 5SW/SE Attending Becca Lloyd MD   Hosp Day # 0 PCP PHYSICIAN NONSTAFF     Chief Complaint:   Chief Complaint   Patient presents with    Nausea/Vomiting/Diarrhea    Abdomen/Flank Pain       Subjective:   Brenda Portillo is doing better today. No further N/V. Still has RUQ pain worse with movement and taking a deep breath but feels better. No SOB no F/C.     CIWA 2-5 overnight. BP running high   Objective:   Objective:    Blood pressure (!) 148/94, pulse 86, temperature 97.9 °F (36.6 °C), temperature source Oral, resp. rate 16, height 5' 1\" (1.549 m), weight 173 lb 14.4 oz (78.9 kg), last menstrual period 10/29/2024, SpO2 93%.    Physical Exam:    General: No acute distress. Scalp psoriasis   Respiratory: Clear to auscultation bilaterally. No wheezes. No rhonchi.  Cardiovascular: S1, S2. Regular rate and rhythm. No murmurs, rubs or gallops.   Abdomen: Soft, nontender, nondistended.  Positive bowel sounds. No rebound or guarding.  Neurologic: No focal neurological deficits.   Musculoskeletal: Moves all extremities.  Extremities: No edema.      Results:   Results:    Labs:  Recent Labs   Lab 24  0313 24  0155   WBC 13.6* 7.0   HGB 12.9 11.3*   .5* 102.6*   .0 166.0       Recent Labs   Lab 24  0313 24  0155   * 97   BUN <5* <5*   CREATSERUM 0.68 0.51*   CA 9.7 8.8   ALB 4.5 3.8    138   K 3.5 3.3*    104   CO2 26.0 28.0   ALKPHO 149* 105*   * 73*   ALT 74* 41   BILT 1.0 1.6*   TP 8.6* 7.0       Estimated Creatinine Clearance: 105.1 mL/min (A) (based on SCr of 0.51 mg/dL (L)).    No results for input(s): \"PTP\", \"INR\" in the last 168 hours.         Culture:  No results found for this visit on 24.    Cardiac  No results for input(s): \"TROP\", \"PBNP\" in the last 168  hours.      Imaging: Imaging data reviewed in [facility].  XR CHEST AP PORTABLE  (CPT=71045)    Result Date: 11/13/2024  CONCLUSION: Low lung volumes.  Linear bibasilar pulmonary opacities likely representing atelectasis.   Dictated by (CST): Jemal Vazquez MD on 11/13/2024 at 10:27 AM     Finalized by (CST): Jemal Vazquez MD on 11/13/2024 at 10:28 AM          US GALLBLADDER (CPT=76705)    Result Date: 11/13/2024  CONCLUSION:   Normal gallbladder.  No biliary ductal dilatation.  Questionable pericholecystic fluid seen on the prior CT is not seen on this ultrasound.  Hepatic steatosis with focal fatty sparing near the gallbladder fossa.       Preliminary report was given by [facility].  There are no clinically significant discrepancies.    elm-remote   Dictated by (CST): Maco Talbert MD on 11/13/2024 at 9:43 AM     Finalized by (CST): Maco Talbert MD on 11/13/2024 at 10:00 AM          CT ABDOMEN+PELVIS(CONTRAST ONLY)(CPT=74177)    Result Date: 11/13/2024  CONCLUSION:   1. No radiopaque gallstones are seen, but there is mild pericholecystic fluid.  Differential etiology includes hepatocellular disease or less likely noncalcified gallstones.  Consider further evaluation with right upper quadrant ultrasound.  2. Hepatomegaly and steatosis.  Findings suggesting early fibrosis/portal hypertension including recanalization of the umbilical vein and small upper abdominal varices.  3. Mild thickening of the urinary bladder wall which may be secondary to underdistention, but cystitis is not entirely excluded.  Consider evaluation with urinalysis.  4. Additional chronic or incidental findings are described in the body of this report.      Preliminary report was given by [facility].  There are no clinically significant discrepancies.    elm-remote    Dictated by (CST): Maco Talbert MD on 11/13/2024 at 8:55 AM     Finalized by (CST): Maco Talbert MD on 11/13/2024 at 9:08 AM           Medications:    heparin  5,000 Units  Subcutaneous Q8H [facility]    thiamine  100 mg Oral Daily    multivitamin  1 tablet Oral Daily    folic acid  1 mg Oral Daily    pantoprazole  40 mg Intravenous Q12H    melatonin  5 mg Oral Nightly    ARIPiprazole  2 mg Oral Daily    clobetasol   Topical BID         Assessment and Plan:   Assessment & Plan:        N/V   RUQ abd pain   Secondary to alcoholic gastritis/hepatitis   GI on consult.   CT and US reviewed.   IV PPI BID   Advance diet to soft today   LFT's trending down   Hepatic steatosis   Secondary to ETOH  GI on consult.   US no focal liver lesions  Alcohol abuse   Insomnia   Psych on consult  Abilify, melatonin. Atarax PRN   CIWA protocol. Consider stopped as scoring low.   MV, Thiamine, FA.   Elevated BP , no h/o HTN   Secondary to W/D.   Metoprolol 25mg BID.   Psoriasis   Clobetasol     Dispo: home tomorrow     >55min spent, >50% spent counseling and coordinating care in the form of educating pt/family and d/w consultants and staff. Most of the time spent discussing the above plan.        Plan of care discussed with patient or family at bedside.    Becca Lloyd MD  Hospitalist          Supplementary Documentation:     Quality:  DVT Prophylaxis: heparin   CODE status: Full  Dispo: per clinical course            Estimated date of discharge: TBD  Discharge is dependent on: clinical stability  At this point Ms. Portillo is expected to be discharge to: home

## 2024-11-14 NOTE — PROGRESS NOTES
[facility]     Gastroenterology Progress Note    Brenda Portillo Patient Status:  Observation    1980 MRN [id number]   Location [facility] 5SW/SE Attending Becca Lloyd MD   Hosp Day # 0 PCP PHYSICIAN NONSTAFF       Subjective:   Brenda reports feeling better overnight. Low appetite but tolerating low fiber diet, no N/V.   Has right side pain that is worse with deep breaths or movement, currently /10.  VSS, afebrile    Objective:   Blood pressure 129/88, pulse 86, temperature 97.9 °F (36.6 °C), temperature source Oral, resp. rate 16, height 5' 1\" (1.549 m), weight 173 lb 14.4 oz (78.9 kg), last menstrual period 10/29/2024, SpO2 93%. Body mass index is 32.86 kg/m².    General: awake, alert and oriented, no acute distress  HEENT: moist mucus membranes  PULM: no conversational dyspnea  CARDIOVASCULAR: regular rate and rhythm, the extremities are warm and well perfused  GI: tender RUQ, & right lateral side. soft, non-distended, + BS, no hepatosplenomegaly, no rebound/guarding   EXTREMITIES: no edema, moving all extremities  SKIN: +psoriatic lesion to trunk  NEURO: appropriate and interactive    Assessment and Plan:   Brenda Portillo is a a(n) 44 year old female w/ a history of alcohol use disorder, anxiety, depression, who presents with n/v and upper abdominal pain.      #  hepatic steatosis likely 2/2 alcohol   - acute hep viral panel negative and autoimmune serologies in process  - etoh cessation  - US without biliary dilation, normal gallbladder. No focal liver lesions  - LFTs improved from yesterday except bilirubin 1.0 --> 1.6     # acute n/v, epigastric pain  #RUQ abd pain  - likely etoh induced gastropathy, possibly capsular stretch causing RUQ pain  - empiric ppi  - hemoglobin 11.3 today, down from 13.6 yesterday     # alcohol use disorder  - ciwa protocol  - etoh cessation    Recommend:  -ETOH cessation  -okay to continue daily PPI  -can follow up with GI  outpatient, has appt scheduled 11/21/24      SHIRLEY Restrepo    [facility] - Gastroenterology   11/14/24     Case discussed with Dr. Falcon & RN Yuli      Results:     Lab Results   Component Value Date    WBC 7.0 11/14/2024    HGB 11.3 (L) 11/14/2024    HCT 35.7 11/14/2024    .0 11/14/2024    CREATSERUM 0.51 (L) 11/14/2024    BUN <5 (L) 11/14/2024     11/14/2024    K 3.3 (L) 11/14/2024     11/14/2024    CO2 28.0 11/14/2024    GLU 97 11/14/2024    CA 8.8 11/14/2024    ALB 3.8 11/14/2024    ALKPHO 105 (H) 11/14/2024    BILT 1.6 (H) 11/14/2024    TP 7.0 11/14/2024    AST 73 (H) 11/14/2024    ALT 41 11/14/2024    TSH 1.620 04/28/2023    LIP 52 11/13/2024    DDIMER 0.34 10/23/2024    MG 2.1 11/14/2024    PHOS 3.1 11/14/2024    ETOH 81 (H) 11/13/2024       XR CHEST AP PORTABLE  (CPT=71045)    Result Date: 11/13/2024  CONCLUSION: Low lung volumes.  Linear bibasilar pulmonary opacities likely representing atelectasis.   Dictated by (CST): Jemal Vazquez MD on 11/13/2024 at 10:27 AM     Finalized by (CST): Jemal Vazquez MD on 11/13/2024 at 10:28 AM          US GALLBLADDER (CPT=76705)    Result Date: 11/13/2024  CONCLUSION:   Normal gallbladder.  No biliary ductal dilatation.  Questionable pericholecystic fluid seen on the prior CT is not seen on this ultrasound.  Hepatic steatosis with focal fatty sparing near the gallbladder fossa.       Preliminary report was given by [facility].  There are no clinically significant discrepancies.    elm-remote   Dictated by (CST): Maco Talbert MD on 11/13/2024 at 9:43 AM     Finalized by (CST): Maco Talbert MD on 11/13/2024 at 10:00 AM          CT ABDOMEN+PELVIS(CONTRAST ONLY)(CPT=74177)    Result Date: 11/13/2024  CONCLUSION:   1. No radiopaque gallstones are seen, but there is mild pericholecystic fluid.  Differential etiology includes hepatocellular disease or less likely noncalcified gallstones.  Consider further evaluation with right upper quadrant  ultrasound.  2. Hepatomegaly and steatosis.  Findings suggesting early fibrosis/portal hypertension including recanalization of the umbilical vein and small upper abdominal varices.  3. Mild thickening of the urinary bladder wall which may be secondary to underdistention, but cystitis is not entirely excluded.  Consider evaluation with urinalysis.  4. Additional chronic or incidental findings are described in the body of this report.      Preliminary report was given by [facility].  There are no clinically significant discrepancies.    elm-remote    Dictated by (CST): Maco Talbert MD on 11/13/2024 at 8:55 AM     Finalized by (CST): Maco Talbert MD on 11/13/2024 at 9:08 AM         EKG 12 Lead    Result Date: 11/13/2024  Sinus tachycardia Cannot rule out Anterior infarct , age undetermined Abnormal ECG When compared with ECG of 23-OCT-2024 01:45, No significant change was found Confirmed by Max Briceño ([id number]) on 11/13/2024 12:16:02 PM

## 2024-11-14 NOTE — CM/SW NOTE
11/13/24 1400   CM/SW Referral Data   Referral Source Physician   Reason for Referral Discharge planning   Informant EMR;Clinical Staff Member   Medical Hx   Does patient have an established PCP? Yes   Significant Past Medical/Mental Health Hx Alcohol use disorder   Patient Info   Advanced directives? No   Patient's Current Mental Status at Time of Assessment Alert;Oriented   Patient Status Prior to Admission   Independent with ADLs and Mobility Yes   Discharge Needs   Anticipated D/C needs Substance abuse treatment     SW received MD order for discharge planning.    Per EMR, pt admitted for n/v at work.  Pt has hx of ETOH abuse.  [facility] protocol in place.    SW to defer discharge planning to psychiatry team.  Psych SW to provide pt resources for ETOH abuse as appropriate.    / to remain available for support and/or discharge planning.     Briseyda Wren MSW, LSW [id number]

## 2024-11-14 NOTE — PLAN OF CARE
Problem: Patient Centered Care  Goal: Patient preferences are identified and integrated in the patient's plan of care  Description: Interventions:  - What would you like us to know as we care for you? Home alone   - Provide timely, complete, and accurate information to patient/family  - Incorporate patient and family knowledge, values, beliefs, and cultural backgrounds into the planning and delivery of care  - Encourage patient/family to participate in care and decision-making at the level they choose  - Honor patient and family perspectives and choices  Outcome: Progressing     Problem: Patient/Family Goals  Goal: Patient/Family Long Term Goal  Description: Patient's Long Term Goal: Discharge    Interventions:  - Follow healthcare team treatment plan   - Monitor labs, vitals, and diagnostic test  - Pain Management   - Diet Recommendations  - Participate in therapy  - Discharge planning   - See additional Care Plan goals for specific interventions  Outcome: Progressing  Goal: Patient/Family Short Term Goal  Description: Patient's Short Term Goal: feel better    Interventions:   - - Follow healthcare team treatment plan   - Monitor labs, vitals, and diagnostic test  - Pain Management, pharmacological and non-pharmacological interventions  - Diet Recommendations  - Adequate oral intake  - GI consults  - Pysch consult   - antiemetics as needed   -IV hydration   -CIWA per protocol   - See additional Care Plan goals for specific interventions  Outcome: Progressing     Problem: PAIN - ADULT  Goal: Verbalizes/displays adequate comfort level or patient's stated pain goal  Description: INTERVENTIONS:  - Encourage pt to monitor pain and request assistance  - Assess pain using appropriate pain scale  - Administer analgesics based on type and severity of pain and evaluate response  - Implement non-pharmacological measures as appropriate and evaluate response  - Consider cultural and social influences on pain and pain  management  - Manage/alleviate anxiety  - Utilize distraction and/or relaxation techniques  - Monitor for opioid side effects  - Notify MD/LIP if interventions unsuccessful or patient reports new pain  - Anticipate increased pain with activity and pre-medicate as appropriate  Outcome: Progressing     Problem: DISCHARGE PLANNING  Goal: Discharge to home or other facility with appropriate resources  Description: INTERVENTIONS:  - Identify barriers to discharge w/pt and caregiver  - Include patient/family/discharge partner in discharge planning  - Arrange for needed discharge resources and transportation as appropriate  - Identify discharge learning needs (meds, wound care, etc)  - Arrange for interpreters to assist at discharge as needed  - Consider post-discharge preferences of patient/family/discharge partner  - Complete POLST form as appropriate  - Assess patient's ability to be responsible for managing their own health  - Refer to Case Management Department for coordinating discharge planning if the patient needs post-hospital services based on physician/LIP order or complex needs related to functional status, cognitive ability or social support system  Outcome: Progressing     Problem: GASTROINTESTINAL - ADULT  Goal: Minimal or absence of nausea and vomiting  Description: INTERVENTIONS:  - Maintain adequate hydration with IV or PO as ordered and tolerated  - Nasogastric tube to low intermittent suction as ordered  - Evaluate effectiveness of ordered antiemetic medications  - Provide nonpharmacologic comfort measures as appropriate  - Advance diet as tolerated, if ordered  - Obtain nutritional consult as needed  - Evaluate fluid balance  Outcome: Progressing     Problem: METABOLIC/FLUID AND ELECTROLYTES - ADULT  Goal: Electrolytes maintained within normal limits  Description: INTERVENTIONS:  - Monitor labs and rhythm and assess patient for signs and symptoms of electrolyte imbalances  - Administer electrolyte  replacement as ordered  - Monitor response to electrolyte replacements, including rhythm and repeat lab results as appropriate  - Fluid restriction as ordered  - Instruct patient on fluid and nutrition restrictions as appropriate  Outcome: Progressing     Problem: ANXIETY  Goal: Will report anxiety at manageable levels  Description: INTERVENTIONS:  - Administer medication as ordered  - Teach and rehearse alternative coping skills  - Provide emotional support with 1:1 interaction with staff  Outcome: Progressing     Problem: DRUG ABUSE/DETOX  Goal: Will have no detox symptoms and will verbalize plan for changing drug-related behavior  Description: INTERVENTIONS:  - Administer medication as ordered  - Monitor physical status  - Provide emotional support with 1:1 interaction with staff  - Encourage 12-Step involvement or recovery focused alternative  Outcome: Progressing   Patient alert and oriented x 4, clear liquid diet, CIWA Q4 hours, pain and nausea medication as needed, fall preventions in place  call light within reach
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
## 2024-11-15 NOTE — PLAN OF CARE
No acute changes at this time. Safety precautions in place. Call light within reach.   Problem: Patient Centered Care  Goal: Patient preferences are identified and integrated in the patient's plan of care  Description: Interventions:  - What would you like us to know as we care for you? Home alone   - Provide timely, complete, and accurate information to patient/family  - Incorporate patient and family knowledge, values, beliefs, and cultural backgrounds into the planning and delivery of care  - Encourage patient/family to participate in care and decision-making at the level they choose  - Honor patient and family perspectives and choices  Outcome: Progressing     Problem: Patient/Family Goals  Goal: Patient/Family Long Term Goal  Description: Patient's Long Term Goal: Discharge    Interventions:  - Follow healthcare team treatment plan   - Monitor labs, vitals, and diagnostic test  - Pain Management   - Diet Recommendations  - Participate in therapy  - Discharge planning   - See additional Care Plan goals for specific interventions  Outcome: Progressing  Goal: Patient/Family Short Term Goal  Description: Patient's Short Term Goal: feel better    Interventions:   - - Follow healthcare team treatment plan   - Monitor labs, vitals, and diagnostic test  - Pain Management, pharmacological and non-pharmacological interventions  - Diet Recommendations  - Adequate oral intake  - GI consults  - Pysch consult   - antiemetics as needed   -IV hydration   -CIWA per protocol   - See additional Care Plan goals for specific interventions  Outcome: Progressing     Problem: PAIN - ADULT  Goal: Verbalizes/displays adequate comfort level or patient's stated pain goal  Description: INTERVENTIONS:  - Encourage pt to monitor pain and request assistance  - Assess pain using appropriate pain scale  - Administer analgesics based on type and severity of pain and evaluate response  - Implement non-pharmacological measures as appropriate and  evaluate response  - Consider cultural and social influences on pain and pain management  - Manage/alleviate anxiety  - Utilize distraction and/or relaxation techniques  - Monitor for opioid side effects  - Notify MD/LIP if interventions unsuccessful or patient reports new pain  - Anticipate increased pain with activity and pre-medicate as appropriate  Outcome: Progressing     Problem: DISCHARGE PLANNING  Goal: Discharge to home or other facility with appropriate resources  Description: INTERVENTIONS:  - Identify barriers to discharge w/pt and caregiver  - Include patient/family/discharge partner in discharge planning  - Arrange for needed discharge resources and transportation as appropriate  - Identify discharge learning needs (meds, wound care, etc)  - Arrange for interpreters to assist at discharge as needed  - Consider post-discharge preferences of patient/family/discharge partner  - Complete POLST form as appropriate  - Assess patient's ability to be responsible for managing their own health  - Refer to Case Management Department for coordinating discharge planning if the patient needs post-hospital services based on physician/LIP order or complex needs related to functional status, cognitive ability or social support system  Outcome: Progressing     Problem: GASTROINTESTINAL - ADULT  Goal: Minimal or absence of nausea and vomiting  Description: INTERVENTIONS:  - Maintain adequate hydration with IV or PO as ordered and tolerated  - Nasogastric tube to low intermittent suction as ordered  - Evaluate effectiveness of ordered antiemetic medications  - Provide nonpharmacologic comfort measures as appropriate  - Advance diet as tolerated, if ordered  - Obtain nutritional consult as needed  - Evaluate fluid balance  Outcome: Progressing     Problem: METABOLIC/FLUID AND ELECTROLYTES - ADULT  Goal: Electrolytes maintained within normal limits  Description: INTERVENTIONS:  - Monitor labs and rhythm and assess patient  for signs and symptoms of electrolyte imbalances  - Administer electrolyte replacement as ordered  - Monitor response to electrolyte replacements, including rhythm and repeat lab results as appropriate  - Fluid restriction as ordered  - Instruct patient on fluid and nutrition restrictions as appropriate  Outcome: Progressing     Problem: ANXIETY  Goal: Will report anxiety at manageable levels  Description: INTERVENTIONS:  - Administer medication as ordered  - Teach and rehearse alternative coping skills  - Provide emotional support with 1:1 interaction with staff  Outcome: Progressing     Problem: DRUG ABUSE/DETOX  Goal: Will have no detox symptoms and will verbalize plan for changing drug-related behavior  Description: INTERVENTIONS:  - Administer medication as ordered  - Monitor physical status  - Provide emotional support with 1:1 interaction with staff  - Encourage 12-Step involvement or recovery focused alternative  Outcome: Progressing
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
## 2024-11-15 NOTE — DISCHARGE SUMMARY
[facility] Hospitalist Discharge Summary   Patient ID:  Brenda Portillo  [id number]  44 year old  5/8/1980    Admit date: 11/13/2024  Discharge date: 11/15/2024  Primary Care Physician: PHYSICIAN NONSTAFF   Attending Physician: Becca Lloyd MD   Consults:   Consultants         Provider   Role Specialty     Chico Kwong MD      Consulting Physician Psychiatry     Jay Edmondson MD      Consulting Physician GASTROENTEROLOGY            Discharge Diagnoses:   Nausea and vomiting in adult  Alcoholic gastritis   Elevated BP, no h/o HTN     Reason for admission  Copied from admission H&P: Brenda Portillo is a(n) 44 year old female, PMH ETOH abuse who presents to the ER with c/o 3 weeks of N/V and RUQ abd pain. Pt drinks 1pint of heavy alcohol daily and recently cut back on this last drink was yesterday morning. She has started to drink 1 pint q4 days now. She has noticed increasing N/V with episode of bilious vomiting and 1 episode of hematemesis. RUQ pain is worse with vomiting sharp pain that comes and goes worse with taking a deep breath. Mild SOB no cough no fevers no CP. No diarrhea. Appetite poor.   In ER pt tachycardic and hypertensive. Unable to tolerate PO due to N/V. GI consulted.     Hospital Course:    N/V   RUQ abd pain   Secondary to alcoholic gastritis possible capsular stretch.   GI on consult.   CT and US reviewed.   IV PPI BID --> PO PPI daily on DC.   Tolerted soft diet.   LFT's trending down   Hepatic steatosis   Secondary to ETOH  GI on consult.   US no focal liver lesions  Alcohol abuse   Insomnia   Psych on consult  Abilify, melatonin. Atarax PRN   CIWA protocol stopped.   MV, Thiamine, FA.   Elevated BP , no h/o HTN   Secondary to W/D.   Metoprolol 25mg BID while here.   Outpt BP usually controlled.   Keep log of BP at home take to appt [facility] PCP   Psoriasis   Clobetasol        EXAM:   GENERAL: no apparent distress, comfortable  NEURO: A/A Ox3, no focal deficits  RESP: non labored,  CTAB/L  CARDIO: Regular, no murmur  ABD: soft, NT, ND  EXTREMITIES: no edema, no calf tenderness    Operative Procedures:     Discharge Instructions     Medication List        CONTINUE taking these medications      pantoprazole 40 MG Tbec  Commonly known as: Protonix  Take 1 tablet (40 mg total) by mouth daily.            STOP taking these medications      chlordiazePOXIDE 25 MG Caps  Commonly known as: Librium     clotrimazole-betamethasone 1-0.05 % Crea  Commonly known as: Lotrisone     ondansetron 4 MG Tbdp  Commonly known as: Zofran-ODT               Where to Get Your Medications        These medications were sent to [id number] - [place], [place] - [address] AT [facility], [phone], [phone] [address], [facility] [address]      Phone: [phone]   pantoprazole 40 MG Tbec         Activity: activity as tolerated  Diet: regular diet  Wound Care: NA  Code Status: No Order        Discharge Instructions         You were seen by Psychiatry while in the hospital. The recommendation is substance abuse treatment. Referrals for inpatient and outpatient services are listed below. Please CALL to schedule an intake appointment.    [address] [address]  [phone]    [facility]  Outpatient  [address] [address]  [phone]    [facility]  [facility]  [address] [facility]  [address]  [place] [address]  [phone]  [facility] on    Alcoholism and Substance Abuse  [address] [place] [address]  [phone]       [facility]  SHARE Program  [address] [place] [address] [id number] [phone]    If you feel unsafe or your symptoms intensify, please call 9-1-1 or go to the nearest emergency room.    Check your BP daily at home in AM keep log for your appt with Tamika Lovell.            Discharge References/Attachments    Hypertension, To Be Confirmed (English)  Cirrhosis (English)           Important follow up:   Follow-up Information       Tamika Lovell APRN. Call.    Specialty: Nurse Practitioner Family  Why: call with update on your visit and symptoms  Contact information:  [address] [facility] [address] [address] [phone]               Indira Christie PA-C Follow up in 2 week(s).    Specialty: Physician Assistant  Contact information:  [address] [facility] [address]  [facility] [address] [phone]                             -PCP in [] within 7 days [] within 14 days [] other     Disposition: home  Discharged Condition: good    Hospital Discharge Diagnoses:  alcoholic gastritis    Lace+ Score: 53  59-90 High Risk  29-58 Medium Risk  0-28   Low Risk.    TCM Follow-Up Recommendation:  LACE > 58: High Risk of readmission after discharge from the hospital.            Total Time Coordinating Care: Greater than 30 minutes    Patient had opportunity to ask questions, state understanding, and agree with therapeutic plan as outlined    Becca Lloyd MD  Hospitalist  11/15/2024

## 2024-11-15 NOTE — PLAN OF CARE
Problem: Patient Centered Care  Goal: Patient preferences are identified and integrated in the patient's plan of care  Description: Interventions:  - What would you like us to know as we care for you? Home alone   - Provide timely, complete, and accurate information to patient/family  - Incorporate patient and family knowledge, values, beliefs, and cultural backgrounds into the planning and delivery of care  - Encourage patient/family to participate in care and decision-making at the level they choose  - Honor patient and family perspectives and choices  Outcome: Progressing     Problem: Patient/Family Goals  Goal: Patient/Family Long Term Goal  Description: Patient's Long Term Goal: Discharge    Interventions:  - Follow healthcare team treatment plan   - Monitor labs, vitals, and diagnostic test  - Pain Management   - Diet Recommendations  - Participate in therapy  - Discharge planning   - See additional Care Plan goals for specific interventions  Outcome: Progressing  Goal: Patient/Family Short Term Goal  Description: Patient's Short Term Goal: feel better    Interventions:   - - Follow healthcare team treatment plan   - Monitor labs, vitals, and diagnostic test  - Pain Management, pharmacological and non-pharmacological interventions  - Diet Recommendations  - Adequate oral intake  - GI consults  - Pysch consult   - antiemetics as needed   -IV hydration   -CIWA per protocol   - See additional Care Plan goals for specific interventions  Outcome: Progressing     Problem: PAIN - ADULT  Goal: Verbalizes/displays adequate comfort level or patient's stated pain goal  Description: INTERVENTIONS:  - Encourage pt to monitor pain and request assistance  - Assess pain using appropriate pain scale  - Administer analgesics based on type and severity of pain and evaluate response  - Implement non-pharmacological measures as appropriate and evaluate response  - Consider cultural and social influences on pain and pain  management  - Manage/alleviate anxiety  - Utilize distraction and/or relaxation techniques  - Monitor for opioid side effects  - Notify MD/LIP if interventions unsuccessful or patient reports new pain  - Anticipate increased pain with activity and pre-medicate as appropriate  Outcome: Progressing     Problem: DISCHARGE PLANNING  Goal: Discharge to home or other facility with appropriate resources  Description: INTERVENTIONS:  - Identify barriers to discharge w/pt and caregiver  - Include patient/family/discharge partner in discharge planning  - Arrange for needed discharge resources and transportation as appropriate  - Identify discharge learning needs (meds, wound care, etc)  - Arrange for interpreters to assist at discharge as needed  - Consider post-discharge preferences of patient/family/discharge partner  - Complete POLST form as appropriate  - Assess patient's ability to be responsible for managing their own health  - Refer to Case Management Department for coordinating discharge planning if the patient needs post-hospital services based on physician/LIP order or complex needs related to functional status, cognitive ability or social support system  Outcome: Progressing     Problem: GASTROINTESTINAL - ADULT  Goal: Minimal or absence of nausea and vomiting  Description: INTERVENTIONS:  - Maintain adequate hydration with IV or PO as ordered and tolerated  - Nasogastric tube to low intermittent suction as ordered  - Evaluate effectiveness of ordered antiemetic medications  - Provide nonpharmacologic comfort measures as appropriate  - Advance diet as tolerated, if ordered  - Obtain nutritional consult as needed  - Evaluate fluid balance  Outcome: Progressing     Problem: METABOLIC/FLUID AND ELECTROLYTES - ADULT  Goal: Electrolytes maintained within normal limits  Description: INTERVENTIONS:  - Monitor labs and rhythm and assess patient for signs and symptoms of electrolyte imbalances  - Administer electrolyte  replacement as ordered  - Monitor response to electrolyte replacements, including rhythm and repeat lab results as appropriate  - Fluid restriction as ordered  - Instruct patient on fluid and nutrition restrictions as appropriate  Outcome: Progressing     Problem: ANXIETY  Goal: Will report anxiety at manageable levels  Description: INTERVENTIONS:  - Administer medication as ordered  - Teach and rehearse alternative coping skills  - Provide emotional support with 1:1 interaction with staff  Outcome: Progressing     Problem: DRUG ABUSE/DETOX  Goal: Will have no detox symptoms and will verbalize plan for changing drug-related behavior  Description: INTERVENTIONS:  - Administer medication as ordered  - Monitor physical status  - Provide emotional support with 1:1 interaction with staff  - Encourage 12-Step involvement or recovery focused alternative  Outcome: Progressing
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
## 2024-11-18 NOTE — TELEPHONE ENCOUNTER
Patient came to fill out janak and pay for fee for disability forms processing. Foms emailed to forms dept

## 2024-11-24 NOTE — TELEPHONE ENCOUNTER
Only TINO/Form Completion Request forms received in forms dept no actual disability forms received in forms dept  - scanned TINO into patient chart

## 2024-11-27 NOTE — TELEPHONE ENCOUNTER
Called patient and couldn't lmtcb due to mail box being full - Wanted to advise patient we did not receive any forms for her claim we only got TINO- Please ask patient to submit forms to our dept for completion - TINO scanned and placed in Archive

## 2025-06-13 NOTE — ED INITIAL ASSESSMENT (HPI)
Pt reports left lower back pain for the past 4-5 months but states in the past 2 weeks pain has increased.  States she has been on her feet a lot at work.   No pain medications taken today.

## 2025-06-13 NOTE — TELEPHONE ENCOUNTER
90 day supply requested.      Current Outpatient Medications:     QUEtiapine (SEROQUEL) 50 MG Oral Tab, Take 1 tablet (50 mg total) by mouth as needed., Disp: 30 tablet, Rfl: 0
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
## 2025-06-16 NOTE — TELEPHONE ENCOUNTER
Please review: medication fails/has no protocol attached.    No future appointments with primary care medicine   Last office visit: 4/21/25

## 2025-06-25 NOTE — DISCHARGE INSTRUCTIONS
The following providers offer services for those struggling with substance use disorders:    [facility]     [facility] [address]     [address]     [place] [phone]    If you are experiencing a behavioral health crisis, please go to your closest emergency room or contact the crisis line  at [phone] or [phone]
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
## 2025-06-25 NOTE — BH LEVEL OF CARE ASSESSMENT
Crisis Evaluation Assessment    Brenda Portillo YOB: 1980   Age 45 year old MRN [id number]   Location [facility] EMERGENCY DEPARTMENT Attending Yoni Meier MD      Patient's legal sex: female  Patient identifies as: female  Patient's birth sex: female  Preferred pronouns: she/her    Date of Service: 2025    Referral Source:  Referral Source  Where was crisis eval performed?: On-site  Referral Source: Self-Referral/Former Patient/Returning Patient  Referral Source Info: Pt presented requesting detox from alcohol.    Patient was seen at [facility] ED room 21 on 2025 at about 0320.     Reason for Crisis Evaluation   Patient is a 45-year-old female who presents requesting detox from alcohol-is drinking fifth of vodka daily.    Met with patient briefly who does not want rehab or detox.  She says she already has an appointment set up with a psychiatrist and a therapist and was not interested in assistance with getting to rehab facility or detox place via the ED.  Patient was willing to take resource referral information.      Suicide Risk Screenin. Have you wished you were dead or wished you could go to sleep and not wake up? (past 30 days): No  2. Have you actually had any thoughts of killing yourself? (past 30 days): No              6. Have you ever done anything, started to do anything, or prepared to do anything to end your life? (lifetime): No     Score - BH OV: No Risk    Suicide Risk Assessments:                Disposition: Consulted with ED doctor and updated them on patient's statements of not wanting rehab or detox.  Counselor did give patient resources for services and patient was  encouraged to return to the ED if symptoms worsen.       Level of Care Recommendations  Consulted with: Dr Meier  Level of Care Recommendation: Residential (residential substance abuse tx)  Reason: detox in medical environment, develop skills and obtain supports to manage  addiction  Recommended Facility: [facility]  Refused Treatment: Yes  Can an [facility] Staff Call You in 24-72 Hours to discuss care?: Yes  Navigator to call the patient: Substance Use/CD  Refused Treatment Reason: Other  Refused Treatment Other Reason: pt said she already has appts set up with psychiatrist and counselor  Education Provided: Call 911 in an Emergency, [facility] Crisis Line Number, Advised to call if condition worsens, Advised to call with questions  Transferred: Ebony CARLSON

## 2025-06-25 NOTE — ED INITIAL ASSESSMENT (HPI)
Pt wanting to detox from alcohol. Pt reports drinking about a handle a day of vodka for past two weeks. Last drink 3 hours PTA. Reports bright red emesis x1 this morning.

## 2025-07-02 NOTE — TELEPHONE ENCOUNTER
Hello,  Sorry I missed you - I am reaching out from the [facility] department, following up on an assessment you had completed on 06/25/2025 and to assist in connecting you with resources for care. If you would like to discuss this further, please give us a call back at [phone], or for more immediate assistance you can contact our 24-hour help line at [phone] We look forward to hearing from you soon.

## (undated) NOTE — LETTER
7/12/2024          Brenda Portillo        [address] [facility] [address]         Dear Brenda,    This letter is to inform you that our office has made several attempts to reach you by phone without success.  We were attempting to contact you by phone regarding your letter request.     Please contact our office at the number listed below as soon as you receive this letter to discuss this issue and to make the necessary changes in our system to your contact information.  Thank you for your cooperation.        Sincerely,    Dayana Rodriguez, SHIRLEY  [address] [facility] [address]   [phone]        Document electronically generated by:  Lorenza GOINS RN

## (undated) NOTE — LETTER
10/30/2024          To Whom It May Concern:    Brenda Portillo is currently under my medical care.   She may return to work the night of 10/31/2024.  Activity is restricted as follows: none.    If you require additional information please contact our office.        Sincerely,    SHIRLEY Nava          Document generated by:  SHIRLEY Nava

## (undated) NOTE — LETTER
Date & Time: 3/2/2022, 2:12 PM  Patient: Daysi Rosenbaum  Encounter Provider(s):    Ghulam Krueger MD       To Whom It May Concern:    Daysi Rosenbaum was seen and treated in our department on 3/2/2022. She should not return to work until 03/04/2022.     If you have any questions or concerns, please do not hesitate to call.        _____________________________  Physician/APC Signature

## (undated) NOTE — LETTER
Date & Time: 3/7/2021, 12:33 PM  Patient: Real Herman  Encounter Provider(s):    SHIRLEY Govea       To Whom It May Concern:    Real Herman was seen and treated in our department on 3/7/2021.  She should not return to work unt

## (undated) NOTE — LETTER
Date & Time: 10/23/2024, 4:41 AM  Patient: Brenda Portillo  Encounter Provider(s):    Jeri Landers DO       To Whom It May Concern:    Brenda Portillo was seen and treated in our department on 10/23/2024. She may return to work on Thursday 10/24/2024.    If you have any questions or concerns, please do not hesitate to call.        _____________________________  Physician/APC Signature

## (undated) NOTE — LETTER
Date & Time: 6/25/2025, 7:39 AM  Patient: Brenda Portillo  Encounter Provider(s):    Yoni Meier MD       To Whom It May Concern:    Brenda Portillo was seen and treated in our department on 6/24/2025. She can return to work 06/26/2025.    If you have any questions or concerns, please do not hesitate to call.        _____________________________  Physician/APC Signature

## (undated) NOTE — LETTER
Date & Time: 6/13/2025, 10:36 AM  Patient: Brenda Portillo  Encounter Provider(s):    Chris Montgomery PA-C       To Whom It May Concern:    Brenda Portillo was seen and treated in our department on 6/13/2025.     If you have any questions or concerns, please do not hesitate to call.      TAMARA Montgomery / Marla Padgett RN  _____________________________  Physician/APC Signature

## (undated) NOTE — LETTER
11/18/2024          To Whom It May Concern:    Brenda Portillo is currently under my medical care and may not return to work at this time.    Please excuse Brenda from November 13th to November 19th.   She may return to work on 11/20/2024.  Activity is restricted as follows: none.    If you require additional information please contact our office.        Sincerely,         SHIRLEY Zapien          Document generated by:  SHIRLEY Zapien

## (undated) NOTE — LETTER
10/25/2024          To Whom It May Concern:    Brenda Portillo is currently under my medical care and may not return to work at this time.    Please excuse Brenda from 10/24/24 to 10/30/24.   She will be re-evaluated on 10/30/24 and determination will be made at that time if she is ready to return to work.     If you require additional information please contact our office.        Sincerely,    SHIRLEY Nava          Document generated by:  SHIRLEY Nava

## (undated) NOTE — LETTER
Date & Time: 11/13/2024, 6:03 AM  Patient: Brenda Portillo  Encounter Provider(s):    Ivonne Lepe MD       To Whom It May Concern:    Brenda Portillo was seen and treated in our department on 11/13/2024. She can return to work on 11-.    If you have any questions or concerns, please do not hesitate to call.        _____________________________  Physician/APC Signature